# Patient Record
Sex: FEMALE | Race: WHITE | NOT HISPANIC OR LATINO | URBAN - NONMETROPOLITAN AREA
[De-identification: names, ages, dates, MRNs, and addresses within clinical notes are randomized per-mention and may not be internally consistent; named-entity substitution may affect disease eponyms.]

---

## 2023-08-08 DIAGNOSIS — M79.642 HAND PAIN, LEFT: Primary | ICD-10-CM

## 2023-09-15 ENCOUNTER — TELEPHONE (OUTPATIENT)
Dept: ORTHOPEDIC SURGERY | Facility: CLINIC | Age: 48
End: 2023-09-15

## 2023-09-15 NOTE — TELEPHONE ENCOUNTER
[facility] received a fax that requires your attention. The document has been indexed to the patient’s chart for your review.      Reason for sending: RECEIVED RECORDS REQUEST FROM [facility] REGARDING APPOINTMENT ON 9/6/2023    Documents Description: RECORDS REQUEST-PRIMARY [facility]ARE [facility]-9/11/2023    Name of Sender: [facility]    Date Indexed: 9/15/2023